# Patient Record
Sex: MALE | Race: WHITE | NOT HISPANIC OR LATINO | Employment: FULL TIME | URBAN - METROPOLITAN AREA
[De-identification: names, ages, dates, MRNs, and addresses within clinical notes are randomized per-mention and may not be internally consistent; named-entity substitution may affect disease eponyms.]

---

## 2024-06-18 ENCOUNTER — OFFICE VISIT (OUTPATIENT)
Dept: FAMILY MEDICINE CLINIC | Facility: CLINIC | Age: 34
End: 2024-06-18
Payer: COMMERCIAL

## 2024-06-18 VITALS
SYSTOLIC BLOOD PRESSURE: 118 MMHG | WEIGHT: 140 LBS | BODY MASS INDEX: 20.04 KG/M2 | HEART RATE: 52 BPM | HEIGHT: 70 IN | OXYGEN SATURATION: 96 % | DIASTOLIC BLOOD PRESSURE: 78 MMHG

## 2024-06-18 DIAGNOSIS — S46.811A STRAIN OF RIGHT TRAPEZIUS MUSCLE, INITIAL ENCOUNTER: ICD-10-CM

## 2024-06-18 DIAGNOSIS — M54.50 LOW BACK PAIN WITHOUT SCIATICA, UNSPECIFIED BACK PAIN LATERALITY, UNSPECIFIED CHRONICITY: Primary | ICD-10-CM

## 2024-06-18 PROCEDURE — 99203 OFFICE O/P NEW LOW 30 MIN: CPT | Performed by: NURSE PRACTITIONER

## 2024-06-18 NOTE — PROGRESS NOTES
"Chief Complaint  Back Pain (Establish care as new patient and talk about fmla)    Subjective        HPI   Nelda presents to [facility] to establish care and with complaint of mid-upper back pain and low back pain:    Right upper-mid back pain - for the past 4 months he has been feeling right upper-mid back pain.  He reports having sharp pain when lifting and reaching forward with right arm, increasing pain level to 9/10.  His pain improves and pain level goes down to 2/10 when in shower under warm water.  His pain is constant with pain level fluctuating depending on movement.  Pain improves when he alternates ice/heat and when resting.  He denies pain in shoulder joint.  He denies pain in upper back when breathing.    Lower Back Pain - he reports lower back pain started about 1 month ago.  His pain feels like muscle tightness, pain level today 5/10, pain will come and go.  Pain increases when he is standing in one place and twisting from side to side.  He denies radiating pain down legs.  He reports massage helps make low back pain feel better.    He denies having any type of injury before his right upper-mid back and lower back pain started.          Objective   Vital Signs:   Vitals:    06/18/24 1306   BP: 118/78   BP Location: Left arm   Patient Position: Sitting   Cuff Size: Adult   Pulse: 52   SpO2: 96%   Weight: 63.5 kg (140 lb)   Height: 177.8 cm (70\")            6/18/2024     1:09 PM   PHQ-2/PHQ-9 Depression Screening   Little Interest or Pleasure in Doing Things 0-->not at all   Feeling Down, Depressed or Hopeless 0-->not at all   PHQ-9: Brief Depression Severity Measure Score 0     BMI is within normal parameters. No other follow-up for BMI required.     Physical Exam  Vitals and nursing note reviewed.   Constitutional:       Appearance: Normal appearance.   HENT:      Head: Normocephalic and atraumatic.   Cardiovascular:      Rate and Rhythm: Normal rate and regular " rhythm.      Heart sounds: Normal heart sounds. No murmur heard.  Pulmonary:      Effort: Pulmonary effort is normal. No respiratory distress.      Breath sounds: Normal breath sounds. No wheezing.   Musculoskeletal:      Lumbar back: Bony tenderness present. No swelling, edema, signs of trauma or spasms. Normal range of motion. Negative right straight leg raise test and negative left straight leg raise test.        Back:    Skin:     General: Skin is warm.   Neurological:      Mental Status: He is alert.          Result Review :                Assessment and Plan    Assessment & Plan  Low back pain without sciatica, unspecified back pain laterality, unspecified chronicity    Strain of right trapezius muscle, initial encounter  Rest from repetitive motion.  Discussed importance of stretching daily.  Alternate Ice/Heat 15-20 mins per day 3 times per day.  Rx:  Diclofenac BID PRN pain - Discussed SE of med  Do not take (OTC) NSIADs with Diclofenac.  Referral Physical Therapy for evaluation and treatment and patient agreed.  Provided work note for today only.    Orders Placed This Encounter   Procedures    Ambulatory Referral to Physical Therapy        New Medications Ordered This Visit   Medications    diclofenac (VOLTAREN) 50 MG EC tablet     Sig: Take 1 tablet by mouth 2 (Two) Times a Day As Needed (Low back pain and trapezius muscle strain).     Dispense:  60 tablet     Refill:  0          Follow Up   Return in about 3 months (around 9/18/2024) for Next scheduled follow up - Back pain.  Patient was given instructions and counseling regarding his condition or for health maintenance advice. Please see specific information pulled into the AVS if appropriate.